# Patient Record
Sex: FEMALE | Employment: OTHER | URBAN - METROPOLITAN AREA
[De-identification: names, ages, dates, MRNs, and addresses within clinical notes are randomized per-mention and may not be internally consistent; named-entity substitution may affect disease eponyms.]

---

## 2024-09-11 ENCOUNTER — TELEPHONE (OUTPATIENT)
Dept: SURGERY | Facility: CLINIC | Age: 64
End: 2024-09-11
Payer: COMMERCIAL

## 2024-09-11 NOTE — TELEPHONE ENCOUNTER
New patient chart prepared for Dr. Hawa Will to review. Np referral from Radha Vaughan for mastodynia, left breast cyst, pain

## 2024-09-18 ENCOUNTER — TELEPHONE (OUTPATIENT)
Dept: SURGERY | Facility: CLINIC | Age: 64
End: 2024-09-18
Payer: COMMERCIAL

## 2024-09-26 ENCOUNTER — OFFICE VISIT (OUTPATIENT)
Dept: SURGERY | Facility: CLINIC | Age: 64
End: 2024-09-26
Payer: COMMERCIAL

## 2024-09-26 VITALS
WEIGHT: 161 LBS | BODY MASS INDEX: 29.63 KG/M2 | OXYGEN SATURATION: 97 % | SYSTOLIC BLOOD PRESSURE: 144 MMHG | DIASTOLIC BLOOD PRESSURE: 82 MMHG | HEART RATE: 61 BPM | HEIGHT: 62 IN

## 2024-09-26 DIAGNOSIS — I25.10 CORONARY ARTERY DISEASE INVOLVING NATIVE HEART, UNSPECIFIED VESSEL OR LESION TYPE, UNSPECIFIED WHETHER ANGINA PRESENT: ICD-10-CM

## 2024-09-26 DIAGNOSIS — N64.4 MASTODYNIA: ICD-10-CM

## 2024-09-26 DIAGNOSIS — Z87.828 HISTORY OF TRAUMA OF BREAST: ICD-10-CM

## 2024-09-26 DIAGNOSIS — Z80.3 FH: BREAST CANCER: ICD-10-CM

## 2024-09-26 DIAGNOSIS — N63.22 MASS OF UPPER INNER QUADRANT OF LEFT BREAST: Primary | ICD-10-CM

## 2024-09-26 DIAGNOSIS — R92.8 ABNORMAL ULTRASOUND OF BREAST: ICD-10-CM

## 2024-09-26 RX ORDER — SPIRONOLACTONE 25 MG/1
1 TABLET ORAL DAILY
COMMUNITY
Start: 2024-08-29

## 2024-09-26 RX ORDER — ESLICARBAZEPINE ACETATE 800 MG/1
1 TABLET ORAL DAILY
COMMUNITY
Start: 2024-08-29

## 2024-09-26 RX ORDER — LEVOTHYROXINE SODIUM 175 UG/1
1 TABLET ORAL DAILY
COMMUNITY
Start: 2024-08-29

## 2024-09-26 RX ORDER — SUCRALFATE 1 G/1
1 TABLET ORAL 4 TIMES DAILY
COMMUNITY
Start: 2024-08-29

## 2024-09-26 RX ORDER — LANOLIN ALCOHOL/MO/W.PET/CERES
1 CREAM (GRAM) TOPICAL DAILY
COMMUNITY
Start: 2024-08-29

## 2024-09-26 RX ORDER — CLONAZEPAM 1 MG/1
1 TABLET ORAL 3 TIMES DAILY
COMMUNITY
Start: 2024-09-10

## 2024-09-26 RX ORDER — ATORVASTATIN CALCIUM 40 MG/1
40 TABLET, FILM COATED ORAL
COMMUNITY
Start: 2024-08-29

## 2024-09-26 RX ORDER — FUROSEMIDE 80 MG
1 TABLET ORAL DAILY
COMMUNITY
Start: 2024-08-29

## 2024-09-26 RX ORDER — POTASSIUM CHLORIDE 1500 MG/1
1 TABLET, EXTENDED RELEASE ORAL EVERY 12 HOURS SCHEDULED
COMMUNITY
Start: 2024-08-29

## 2024-09-26 RX ORDER — TOPIRAMATE 100 MG/1
TABLET, FILM COATED ORAL
COMMUNITY

## 2024-09-26 RX ORDER — ROPINIROLE 1 MG/1
1 TABLET, FILM COATED ORAL EVERY 12 HOURS SCHEDULED
COMMUNITY
Start: 2024-08-29

## 2024-09-26 RX ORDER — LEVETIRACETAM 1000 MG/1
1 TABLET ORAL EVERY 12 HOURS SCHEDULED
COMMUNITY
Start: 2024-08-29

## 2024-09-26 RX ORDER — PRASUGREL 10 MG/1
10 TABLET, FILM COATED ORAL DAILY
COMMUNITY

## 2024-09-26 RX ORDER — ASPIRIN 81 MG/1
1 TABLET, COATED ORAL DAILY
COMMUNITY
Start: 2024-08-29

## 2024-09-26 RX ORDER — RANOLAZINE 500 MG/1
500 TABLET, EXTENDED RELEASE ORAL 2 TIMES DAILY
COMMUNITY

## 2024-09-26 RX ORDER — CETIRIZINE HYDROCHLORIDE 10 MG/1
10 TABLET ORAL
COMMUNITY
Start: 2024-08-29

## 2024-09-26 RX ORDER — SERTRALINE HYDROCHLORIDE 100 MG/1
200 TABLET, FILM COATED ORAL
COMMUNITY
Start: 2024-08-29

## 2024-09-27 ENCOUNTER — TELEPHONE (OUTPATIENT)
Dept: SURGERY | Facility: CLINIC | Age: 64
End: 2024-09-27
Payer: COMMERCIAL

## 2024-10-01 ENCOUNTER — TELEPHONE (OUTPATIENT)
Dept: SURGERY | Facility: CLINIC | Age: 64
End: 2024-10-01
Payer: COMMERCIAL
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
## 2024-10-01 NOTE — TELEPHONE ENCOUNTER
----- Message from Garth MALONE sent at 10/1/2024  1:08 PM EDT -----  Patient called stating that the area where the cyst was aspirated is now hurting and filling back up. Siri spoke with the patient on Friday 0/9/27 and I'm not for sure if Dr. Will had responded to the message that Siri sent.     Can one of the MA's or Provider on call give the patient a call to discuss.

## 2024-10-01 NOTE — TELEPHONE ENCOUNTER
I talked to this pt to get some more information.     Per pt the area of concern is not as fluid filled as before but she is growing concerned that she will need another aspiration.  I have sent a message to Dr. Chawla to follow up with next steps and will contact the pt once I know.     CMA

## 2024-10-03 ENCOUNTER — TELEPHONE (OUTPATIENT)
Dept: SURGERY | Facility: CLINIC | Age: 64
End: 2024-10-03
Payer: COMMERCIAL

## 2024-10-03 NOTE — TELEPHONE ENCOUNTER
The oil cyst has not come back- what she had results from fat necrosis and cannot readily reaccumulate. There may be some fluid there filling the empty space.   No I do not recommend removal.    I called to relay to patient. I had to leave patient a message to call me back.

## 2024-10-07 ENCOUNTER — TELEPHONE (OUTPATIENT)
Dept: SURGERY | Facility: CLINIC | Age: 64
End: 2024-10-07
Payer: COMMERCIAL

## 2024-10-07 NOTE — TELEPHONE ENCOUNTER
Spoke with patient.     Patient reporting new pea sized knot in left breast,   left breast is still very sore to touch, sharp pains, cannot lay on that left side due to soreness.   No redness, no fever. No discharge. Patient states that overall she's just not feeling well.     I let her know I would call her back with surgeon recommendations.

## 2024-10-08 ENCOUNTER — TELEPHONE (OUTPATIENT)
Dept: SURGERY | Facility: CLINIC | Age: 64
End: 2024-10-08
Payer: COMMERCIAL
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
## 2024-10-11 ENCOUNTER — TELEPHONE (OUTPATIENT)
Dept: SURGERY | Facility: CLINIC | Age: 64
End: 2024-10-11
Payer: COMMERCIAL

## 2024-10-11 NOTE — TELEPHONE ENCOUNTER
Spoke to pt and tried to get her mirta for her fu with neo ordoñez   Pt wanted to see iliana not neo she is going to follow up with her PCP Dulce Vaughan and will call us if she needs anything
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
## 2024-11-14 ENCOUNTER — OFFICE VISIT (OUTPATIENT)
Dept: SURGERY | Facility: CLINIC | Age: 64
End: 2024-11-14
Payer: COMMERCIAL

## 2024-11-14 VITALS
SYSTOLIC BLOOD PRESSURE: 142 MMHG | HEART RATE: 75 BPM | BODY MASS INDEX: 28.52 KG/M2 | DIASTOLIC BLOOD PRESSURE: 82 MMHG | OXYGEN SATURATION: 99 % | HEIGHT: 62 IN | WEIGHT: 155 LBS

## 2024-11-14 DIAGNOSIS — N64.4 BREAST PAIN: Primary | ICD-10-CM

## 2024-11-14 DIAGNOSIS — N63.25 MASS OVERLAPPING MULTIPLE QUADRANTS OF LEFT BREAST: ICD-10-CM

## 2024-11-14 PROCEDURE — 99213 OFFICE O/P EST LOW 20 MIN: CPT | Performed by: NURSE PRACTITIONER

## 2024-11-14 PROCEDURE — 1160F RVW MEDS BY RX/DR IN RCRD: CPT | Performed by: NURSE PRACTITIONER

## 2024-11-14 PROCEDURE — 1159F MED LIST DOCD IN RCRD: CPT | Performed by: NURSE PRACTITIONER

## 2024-11-14 RX ORDER — FERROUS SULFATE 325(65) MG
1 TABLET ORAL DAILY
COMMUNITY
Start: 2024-11-06

## 2024-11-14 RX ORDER — DEXLANSOPRAZOLE 60 MG/1
1 CAPSULE, DELAYED RELEASE ORAL DAILY
COMMUNITY
Start: 2024-11-06

## 2024-11-25 ENCOUNTER — TELEPHONE (OUTPATIENT)
Dept: SURGERY | Facility: CLINIC | Age: 64
End: 2024-11-25
Payer: COMMERCIAL

## 2024-12-02 ENCOUNTER — PREP FOR SURGERY (OUTPATIENT)
Dept: OTHER | Facility: HOSPITAL | Age: 64
End: 2024-12-02
Payer: COMMERCIAL

## 2024-12-02 ENCOUNTER — TELEPHONE (OUTPATIENT)
Dept: SURGERY | Facility: CLINIC | Age: 64
End: 2024-12-02
Payer: COMMERCIAL

## 2024-12-02 DIAGNOSIS — R92.8 ABNORMAL ULTRASOUND OF BREAST: Primary | ICD-10-CM

## 2024-12-02 NOTE — TELEPHONE ENCOUNTER
Please set her up for a left breast ultrasound guided biopsy at [facility].  Also she needs a bilateral diagnostic mammogram and left limited ultrasound in 3 months.  Because it will be time for her bilateral screening.

## 2024-12-03 NOTE — TELEPHONE ENCOUNTER
Pt was mirta for her breast bx at [facility] on 12/5 @ 730am and then I mirta her for her jessica dx mammo and left limited us in 3 months on 03/3 @ 930am     I told pt if she has not heard back from me in a week to please call me so I can call [facility] for results     Pt stated understanding

## 2024-12-11 ENCOUNTER — TELEPHONE (OUTPATIENT)
Dept: SURGERY | Facility: CLINIC | Age: 64
End: 2024-12-11
Payer: COMMERCIAL

## 2024-12-11 NOTE — TELEPHONE ENCOUNTER
Per Dr. Andrade pt is cleared to stop her blood thinner 5 days prior to her breast biopsy.  We will get this rescheduled for her.  I spoke to Aminata.     CMA

## 2024-12-12 ENCOUNTER — TELEPHONE (OUTPATIENT)
Dept: SURGERY | Facility: CLINIC | Age: 64
End: 2024-12-12
Payer: COMMERCIAL

## 2024-12-12 NOTE — TELEPHONE ENCOUNTER
----- Message from Shasta GIBSON sent at 12/11/2024  1:29 PM EST -----  Sara- this pt has been cleared to stop her blood thinner by Dr. Andrade.   Will you please reschedule this and make sure the pt is aware to stop taking her medication 5 days prior to biopsy.   Thanks  ----- Message -----  From: Franck Freeman APRN  Sent: 12/5/2024  10:39 AM EST  To: Shasta Rodriguez MA    I would clear that through her PCP  ----- Message -----  From: Shasta Rodriguez MA  Sent: 12/5/2024   9:18 AM EST  To: DORY Chang; Sara Townsend MA    This pt showed up to [facility] today for her biopsy but was not aware that she needed to stop taking her blood thinner.  I will call the pt to get her rescheduled if you are okay with taking her off her asa for at least 5 days. Or should this be cleared by her PCP?    Mariela at [facility] [phone]

## 2024-12-12 NOTE — TELEPHONE ENCOUNTER
[facility] has still not called me back with a appt time I am waiting for chip to call me back

## 2024-12-13 ENCOUNTER — TELEPHONE (OUTPATIENT)
Dept: SURGERY | Facility: CLINIC | Age: 64
End: 2024-12-13
Payer: COMMERCIAL

## 2024-12-13 NOTE — TELEPHONE ENCOUNTER
Spoke to steve montiel multiple times since Wednesday they have still not mirta pt for bx I gave them a verbal that we talked to pt cardiologist and they gave us a verbal that she can stop her blood thinners for 5 days prior to bx but they need a written consent from the doctor     I will call again on monday

## 2024-12-13 NOTE — TELEPHONE ENCOUNTER
----- Message from Shasta GIBSON sent at 12/11/2024  1:29 PM EST -----  Sara- this pt has been cleared to stop her blood thinner by Dr. Andrade.   Will you please reschedule this and make sure the pt is aware to stop taking her medication 5 days prior to biopsy.   Thanks  ----- Message -----  From: Franck Freeman APRN  Sent: 12/5/2024  10:39 AM EST  To: Shasta Rodriguez MA    I would clear that through her PCP  ----- Message -----  From: Shasta Rodriguez MA  Sent: 12/5/2024   9:18 AM EST  To: DORY Chang; Sara Townsend MA    This pt showed up to [facility] today for her biopsy but was not aware that she needed to stop taking her blood thinner.  I will call the pt to get her rescheduled if you are okay with taking her off her asa for at least 5 days. Or should this be cleared by her PCP?    Mariela at [facility] [phone]

## 2024-12-16 ENCOUNTER — TELEPHONE (OUTPATIENT)
Dept: SURGERY | Facility: CLINIC | Age: 64
End: 2024-12-16
Payer: COMMERCIAL

## 2024-12-16 NOTE — TELEPHONE ENCOUNTER
I called and spoke to steve ulrich and they still ave not mirta pt for a bx they are waiting for written consent that pt can stop blood thinners from her cardiologist I told them that it has been almost a week and this bx should of been mirta they told me they will call me back once they hear more

## 2024-12-17 ENCOUNTER — TELEPHONE (OUTPATIENT)
Dept: SURGERY | Facility: CLINIC | Age: 64
End: 2024-12-17
Payer: COMMERCIAL

## 2025-01-10 ENCOUNTER — TELEPHONE (OUTPATIENT)
Dept: SURGERY | Facility: CLINIC | Age: 65
End: 2025-01-10
Payer: COMMERCIAL

## 2025-01-10 DIAGNOSIS — Z12.31 SCREENING MAMMOGRAM FOR BREAST CANCER: Primary | ICD-10-CM

## 2025-01-10 NOTE — TELEPHONE ENCOUNTER
Please let her know that we do not have the final result back from radiology but the pathology is benign on her biopsy.  Go ahead and set her up for a bilateral screening mammogram in March followed by an appointment with me and if anything changes we will call her back and let her know

## 2025-01-10 NOTE — TELEPHONE ENCOUNTER
Spoke to pt and let her know her pathology came back benign and she stating she is still in pain I told her to make sure she is wearing a supportive bra and to start taking primrose oil twice a day     Pt stated understanding    I mirta her scr mammo we are moving her to here at the hospital on 04/11 @ 1230 and then she will see neo on 04/11 same day at 2pm    Pt stated understanding

## 2025-04-10 ENCOUNTER — TELEPHONE (OUTPATIENT)
Dept: SURGERY | Facility: CLINIC | Age: 65
End: 2025-04-10
Payer: COMMERCIAL
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
## 2025-04-10 NOTE — TELEPHONE ENCOUNTER
Crystal hudson and she did not get a breast us in feb at [facility]   Also called to get the radiologist to add to the bx report from 12/23 that it was benign and concordant since that was never done   They informed me that Dr. Juvenal Ring will be out until 04/21 so it wont get fixed until then   They also told me they will fax the new report over when it is fixed

## 2025-04-11 ENCOUNTER — HOSPITAL ENCOUNTER (OUTPATIENT)
Dept: MAMMOGRAPHY | Facility: HOSPITAL | Age: 65
Discharge: HOME | End: 2025-04-11
Payer: COMMERCIAL

## 2025-04-11 ENCOUNTER — OFFICE VISIT (OUTPATIENT)
Dept: SURGERY | Facility: CLINIC | Age: 65
End: 2025-04-11
Payer: COMMERCIAL

## 2025-04-11 VITALS
SYSTOLIC BLOOD PRESSURE: 138 MMHG | OXYGEN SATURATION: 99 % | HEART RATE: 66 BPM | DIASTOLIC BLOOD PRESSURE: 78 MMHG | HEIGHT: 62 IN | WEIGHT: 144.6 LBS | BODY MASS INDEX: 26.61 KG/M2

## 2025-04-11 DIAGNOSIS — Z12.31 SCREENING MAMMOGRAM FOR BREAST CANCER: ICD-10-CM

## 2025-04-11 DIAGNOSIS — N63.0 BREAST NODULE: ICD-10-CM

## 2025-04-11 DIAGNOSIS — N64.4 BREAST PAIN: Primary | ICD-10-CM

## 2025-04-11 DIAGNOSIS — R92.8 ABNORMAL FINDING ON BREAST IMAGING: ICD-10-CM

## 2025-04-11 PROCEDURE — 1159F MED LIST DOCD IN RCRD: CPT | Performed by: NURSE PRACTITIONER

## 2025-04-11 PROCEDURE — 99213 OFFICE O/P EST LOW 20 MIN: CPT | Performed by: NURSE PRACTITIONER

## 2025-04-11 PROCEDURE — 1160F RVW MEDS BY RX/DR IN RCRD: CPT | Performed by: NURSE PRACTITIONER

## 2025-04-14 ENCOUNTER — TELEPHONE (OUTPATIENT)
Dept: SURGERY | Facility: CLINIC | Age: 65
End: 2025-04-14
Payer: COMMERCIAL

## 2025-04-14 NOTE — TELEPHONE ENCOUNTER
I attempted to schedule this pts L DX MMG and L limited U/S.  Rosanna would not schedule this without the order prior.  Order was faxed to [phone].  I will attempt to call back later to schedule this appt.     CMA

## 2025-05-01 ENCOUNTER — HOSPITAL ENCOUNTER (OUTPATIENT)
Dept: ULTRASOUND IMAGING | Facility: HOSPITAL | Age: 65
End: 2025-05-01
Payer: COMMERCIAL

## 2025-05-28 ENCOUNTER — HOSPITAL ENCOUNTER (OUTPATIENT)
Dept: ULTRASOUND IMAGING | Facility: HOSPITAL | Age: 65
Discharge: HOME | End: 2025-05-28
Payer: COMMERCIAL

## 2025-05-28 ENCOUNTER — HOSPITAL ENCOUNTER (OUTPATIENT)
Dept: MAMMOGRAPHY | Facility: HOSPITAL | Age: 65
Discharge: HOME | End: 2025-05-28
Payer: COMMERCIAL

## 2025-05-28 PROCEDURE — G0279 TOMOSYNTHESIS, MAMMO: HCPCS

## 2025-05-28 PROCEDURE — 76642 ULTRASOUND BREAST LIMITED: CPT

## 2025-05-28 PROCEDURE — 77065 DX MAMMO INCL CAD UNI: CPT

## 2025-05-29 ENCOUNTER — TELEPHONE (OUTPATIENT)
Dept: SURGERY | Facility: CLINIC | Age: 65
End: 2025-05-29
Payer: COMMERCIAL

## 2025-05-29 ENCOUNTER — PREP FOR SURGERY (OUTPATIENT)
Dept: OTHER | Facility: HOSPITAL | Age: 65
End: 2025-05-29
Payer: COMMERCIAL

## 2025-05-29 DIAGNOSIS — R92.8 ABNORMAL FINDINGS ON DIAGNOSTIC IMAGING OF BREAST: Primary | ICD-10-CM

## 2025-05-29 NOTE — TELEPHONE ENCOUNTER
Spoke to pt ad let her know that her bx is here at the hospital on 06/9 be here by 1230  Pt stated understanding  She does not have to hold her aspirin

## 2025-05-29 NOTE — TELEPHONE ENCOUNTER
Called and spoke with patient about recent imaging results needing a biopsy.  I placed an order for a left ultrasound-guided breast biopsy.  Please set this up for the patient I will call her with the results

## 2025-06-03 NOTE — PROGRESS NOTES
06/09/25 0001   Pre-Procedure Phone Call   Procedure Time Verified Yes   Arrival Time 0130   Procedure Location Verified Yes   Medical History Reviewed No   Ride and Caregiver Arranged N/A     Spoke to Mulu, informed her to arrive one hour prior to biopsy, can eat and drink and drive self to and from, advised re type of clothing and timeframe for results to come back. Patient verbalized understanding. Mamm nurse provided our contact number should she need to reach us.

## 2025-06-09 ENCOUNTER — HOSPITAL ENCOUNTER (OUTPATIENT)
Dept: ULTRASOUND IMAGING | Facility: HOSPITAL | Age: 65
Discharge: HOME | End: 2025-06-09
Payer: COMMERCIAL

## 2025-06-12 ENCOUNTER — HOSPITAL ENCOUNTER (OUTPATIENT)
Dept: MAMMOGRAPHY | Facility: HOSPITAL | Age: 65
Discharge: HOME | End: 2025-06-12
Payer: COMMERCIAL

## 2025-06-12 ENCOUNTER — HOSPITAL ENCOUNTER (OUTPATIENT)
Dept: ULTRASOUND IMAGING | Facility: HOSPITAL | Age: 65
Discharge: HOME | End: 2025-06-12
Payer: COMMERCIAL

## 2025-06-12 VITALS
OXYGEN SATURATION: 99 % | SYSTOLIC BLOOD PRESSURE: 113 MMHG | DIASTOLIC BLOOD PRESSURE: 73 MMHG | RESPIRATION RATE: 16 BRPM | TEMPERATURE: 97.3 F | HEART RATE: 52 BPM

## 2025-06-12 DIAGNOSIS — R92.8 ABNORMAL FINDINGS ON DIAGNOSTIC IMAGING OF BREAST: ICD-10-CM

## 2025-06-12 PROCEDURE — 25010000002 LIDOCAINE 1 % SOLUTION: Performed by: RADIOLOGY

## 2025-06-12 PROCEDURE — 88305 TISSUE EXAM BY PATHOLOGIST: CPT | Performed by: NURSE PRACTITIONER

## 2025-06-12 PROCEDURE — A4648 IMPLANTABLE TISSUE MARKER: HCPCS

## 2025-06-12 PROCEDURE — 25010000002 LIDOCAINE 1% - EPINEPHRINE 1:100000 1 %-1:100000 SOLUTION: Performed by: RADIOLOGY

## 2025-06-12 PROCEDURE — 77065 DX MAMMO INCL CAD UNI: CPT

## 2025-06-12 PROCEDURE — 88342 IMHCHEM/IMCYTCHM 1ST ANTB: CPT | Performed by: NURSE PRACTITIONER

## 2025-06-12 PROCEDURE — 88341 IMHCHEM/IMCYTCHM EA ADD ANTB: CPT | Performed by: NURSE PRACTITIONER

## 2025-06-12 RX ORDER — LIDOCAINE HYDROCHLORIDE AND EPINEPHRINE 10; 10 MG/ML; UG/ML
10 INJECTION, SOLUTION INFILTRATION; PERINEURAL ONCE
Status: COMPLETED | OUTPATIENT
Start: 2025-06-12 | End: 2025-06-12

## 2025-06-12 RX ORDER — LIDOCAINE HYDROCHLORIDE 10 MG/ML
10 INJECTION, SOLUTION INFILTRATION; PERINEURAL ONCE
Status: COMPLETED | OUTPATIENT
Start: 2025-06-12 | End: 2025-06-12

## 2025-06-12 RX ADMIN — LIDOCAINE HYDROCHLORIDE 10 ML: 10 INJECTION, SOLUTION INFILTRATION; PERINEURAL at 10:32

## 2025-06-12 RX ADMIN — LIDOCAINE HYDROCHLORIDE,EPINEPHRINE BITARTRATE 10 ML: 10; .01 INJECTION, SOLUTION INFILTRATION; PERINEURAL at 10:32

## 2025-06-12 NOTE — NURSING NOTE
Biopsy site to Left breast clear. Dermabond dry and intact. No firmness or swelling noted at or around biopsy site.  Ice pack with protective covering applied to biopsy site. Discharge instructions discussed with patient. Patient verbalized understanding. Patient does report pain/soreness due to procedure, in the setting of pre-existing pain to the left breast. To home via private vehicle, accompanied by her daughter.
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
## 2025-06-12 NOTE — NURSING NOTE
Dr. Fontenot in to speak with patient. Patient consented and site marked. All patient questions addressed.

## 2025-06-13 ENCOUNTER — TELEPHONE (OUTPATIENT)
Dept: MAMMOGRAPHY | Facility: HOSPITAL | Age: 65
End: 2025-06-13
Payer: COMMERCIAL

## 2025-06-13 LAB
CYTO UR: NORMAL
LAB AP CASE REPORT: NORMAL
LAB AP CLINICAL INFORMATION: NORMAL
LAB AP SPECIAL STAINS: NORMAL
PATH REPORT.FINAL DX SPEC: NORMAL
PATH REPORT.GROSS SPEC: NORMAL

## 2025-06-17 ENCOUNTER — TELEPHONE (OUTPATIENT)
Dept: SURGERY | Facility: CLINIC | Age: 65
End: 2025-06-17
Payer: COMMERCIAL